# Patient Record
Sex: FEMALE | Race: OTHER | Employment: OTHER | URBAN - METROPOLITAN AREA
[De-identification: names, ages, dates, MRNs, and addresses within clinical notes are randomized per-mention and may not be internally consistent; named-entity substitution may affect disease eponyms.]

---

## 2018-04-20 NOTE — PATIENT DISCUSSION
Continue: Thera Tears Nutrition (om-3-epa-dha-fish oil-flax-e): capsule: [phone] dx-jv-yx-unit

## 2018-04-20 NOTE — PATIENT DISCUSSION
New Prescription: olopatadine (olopatadine): drops: 0.1% 1 drop twice a day as directed into both eyes 04-

## 2019-10-22 NOTE — PATIENT DISCUSSION
- Possibly contributing to her symptoms Will refill for 1 month and than needs to schedule appointment.

## 2021-06-02 ENCOUNTER — NEW PATIENT EMERGENCY (OUTPATIENT)
Dept: URBAN - METROPOLITAN AREA CLINIC 43 | Facility: CLINIC | Age: 71
End: 2021-06-02

## 2021-06-02 DIAGNOSIS — H04.412: ICD-10-CM

## 2021-06-02 PROCEDURE — 92002 INTRM OPH EXAM NEW PATIENT: CPT

## 2021-06-02 RX ORDER — CEPHALEXIN 500 MG/1: 1 CAPSULE ORAL TWICE A DAY

## 2021-06-02 ASSESSMENT — VISUAL ACUITY
OS_CC: 20/25
OD_CC: 20/20-1

## 2021-06-06 ENCOUNTER — EST. PATIENT EMERGENCY (OUTPATIENT)
Dept: URBAN - METROPOLITAN AREA CLINIC 39 | Facility: CLINIC | Age: 71
End: 2021-06-06

## 2021-06-06 DIAGNOSIS — H25.13: ICD-10-CM

## 2021-06-06 DIAGNOSIS — H04.412: ICD-10-CM

## 2021-06-06 DIAGNOSIS — H02.88A: ICD-10-CM

## 2021-06-06 DIAGNOSIS — H02.88B: ICD-10-CM

## 2021-06-06 PROCEDURE — 92012 INTRM OPH EXAM EST PATIENT: CPT

## 2021-06-06 ASSESSMENT — TONOMETRY
OS_IOP_MMHG: 20
OD_IOP_MMHG: 18

## 2021-06-06 ASSESSMENT — VISUAL ACUITY
OD_CC: 20/25-2
OS_CC: 20/30

## 2021-06-07 ENCOUNTER — ESTABLISHED PATIENT (OUTPATIENT)
Dept: URBAN - METROPOLITAN AREA CLINIC 44 | Facility: CLINIC | Age: 71
End: 2021-06-07

## 2021-06-07 DIAGNOSIS — H25.13: ICD-10-CM

## 2021-06-07 DIAGNOSIS — H04.412: ICD-10-CM

## 2021-06-07 DIAGNOSIS — H04.322: ICD-10-CM

## 2021-06-07 PROCEDURE — 68420 I&D LACRIMAL SAC: CPT

## 2021-06-07 PROCEDURE — 99213 OFFICE O/P EST LOW 20 MIN: CPT

## 2021-06-07 PROCEDURE — 92285 EXTERNAL OCULAR PHOTOGRAPHY: CPT

## 2021-06-07 RX ORDER — TOBRAMYCIN / DEXAMETHASONE 3; .5 MG/ML; MG/ML
1 SUSPENSION/ DROPS OPHTHALMIC
End: 2021-06-21

## 2021-06-07 ASSESSMENT — VISUAL ACUITY
OS_CC: 20/20
OD_CC: 20/20-1

## 2021-06-09 ENCOUNTER — FOLLOW UP (OUTPATIENT)
Dept: URBAN - METROPOLITAN AREA CLINIC 38 | Facility: CLINIC | Age: 71
End: 2021-06-09

## 2021-06-09 DIAGNOSIS — H04.412: ICD-10-CM

## 2021-06-09 DIAGNOSIS — H04.322: ICD-10-CM

## 2021-06-09 PROCEDURE — 99213 OFFICE O/P EST LOW 20 MIN: CPT

## 2021-06-09 PROCEDURE — 92285 EXTERNAL OCULAR PHOTOGRAPHY: CPT

## 2021-06-09 ASSESSMENT — VISUAL ACUITY
OD_CC: 20/25
OS_CC: 20/30-1

## 2021-09-28 ENCOUNTER — ESTABLISHED PATIENT (OUTPATIENT)
Dept: URBAN - METROPOLITAN AREA CLINIC 44 | Facility: CLINIC | Age: 71
End: 2021-09-28

## 2021-09-28 DIAGNOSIS — H04.552: ICD-10-CM

## 2021-09-28 DIAGNOSIS — H04.322: ICD-10-CM

## 2021-09-28 PROCEDURE — 99213 OFFICE O/P EST LOW 20 MIN: CPT

## 2021-09-28 PROCEDURE — 92285 EXTERNAL OCULAR PHOTOGRAPHY: CPT

## 2021-09-28 ASSESSMENT — VISUAL ACUITY
OS_CC: 20/20-1
OD_CC: 20/20

## 2021-10-05 ENCOUNTER — PRE-OP/H&P (OUTPATIENT)
Dept: URBAN - METROPOLITAN AREA CLINIC 44 | Facility: CLINIC | Age: 71
End: 2021-10-05

## 2021-10-05 DIAGNOSIS — H04.552: ICD-10-CM

## 2021-10-05 DIAGNOSIS — H04.412: ICD-10-CM

## 2021-10-05 DIAGNOSIS — H04.322: ICD-10-CM

## 2021-10-05 DIAGNOSIS — H25.13: ICD-10-CM

## 2021-10-05 PROCEDURE — 99211HP H&P OFFICE/OUTPATIENT VISIT, EST

## 2021-10-05 RX ORDER — ONDANSETRON HYDROCHLORIDE 8 MG/1
TABLET, FILM COATED ORAL ONCE A DAY
Start: 2021-11-02

## 2021-10-05 RX ORDER — TOBRAMYCIN AND DEXAMETHASONE 1; 3 MG/ML; MG/ML
1 SUSPENSION/ DROPS OPHTHALMIC
Start: 2021-11-02

## 2021-10-05 RX ORDER — CEPHALEXIN 500 MG/1
1 CAPSULE ORAL TWICE A DAY
Start: 2021-11-02

## 2021-11-02 ENCOUNTER — SURGERY/PROCEDURE (OUTPATIENT)
Dept: URBAN - METROPOLITAN AREA SURGERY 14 | Facility: SURGERY | Age: 71
End: 2021-11-02

## 2021-11-02 ENCOUNTER — PRE-OP/H&P (OUTPATIENT)
Dept: URBAN - METROPOLITAN AREA SURGERY 14 | Facility: SURGERY | Age: 71
End: 2021-11-02

## 2021-11-02 DIAGNOSIS — H04.412: ICD-10-CM

## 2021-11-02 DIAGNOSIS — H04.322: ICD-10-CM

## 2021-11-02 DIAGNOSIS — H04.552: ICD-10-CM

## 2021-11-02 PROCEDURE — 31200 REMOVAL OF ETHMOID SINUS: CPT

## 2021-11-02 PROCEDURE — 68720 CREATE TEAR SAC DRAIN: CPT

## 2021-11-02 PROCEDURE — 99211T TECH SERVICE

## 2021-11-02 PROCEDURE — 30999 UNLISTED PROCEDURE NOSE: CPT

## 2021-11-02 PROCEDURE — 68815 PROBE NASOLACRIMAL DUCT: CPT

## 2021-11-09 ENCOUNTER — POST-OP (OUTPATIENT)
Dept: URBAN - METROPOLITAN AREA CLINIC 44 | Facility: CLINIC | Age: 71
End: 2021-11-09

## 2021-11-09 DIAGNOSIS — Z98.890: ICD-10-CM

## 2021-11-09 DIAGNOSIS — H04.322: ICD-10-CM

## 2021-11-09 PROCEDURE — 92285 EXTERNAL OCULAR PHOTOGRAPHY: CPT

## 2021-11-09 PROCEDURE — 99024 POSTOP FOLLOW-UP VISIT: CPT

## 2021-11-09 ASSESSMENT — VISUAL ACUITY
OD_SC: 20/20
OS_SC: 20/20

## 2021-11-23 ENCOUNTER — FOLLOW UP (OUTPATIENT)
Dept: URBAN - METROPOLITAN AREA CLINIC 44 | Facility: CLINIC | Age: 71
End: 2021-11-23

## 2021-11-23 DIAGNOSIS — H04.322: ICD-10-CM

## 2021-11-23 DIAGNOSIS — H04.412: ICD-10-CM

## 2021-11-23 DIAGNOSIS — Z98.890: ICD-10-CM

## 2021-11-23 PROCEDURE — 92285 EXTERNAL OCULAR PHOTOGRAPHY: CPT

## 2021-11-23 PROCEDURE — 99024 POSTOP FOLLOW-UP VISIT: CPT

## 2021-11-23 ASSESSMENT — VISUAL ACUITY
OD_SC: 20/20
OS_SC: 20/20

## 2021-12-06 ENCOUNTER — FOLLOW UP (OUTPATIENT)
Dept: URBAN - METROPOLITAN AREA CLINIC 44 | Facility: CLINIC | Age: 71
End: 2021-12-06

## 2021-12-06 DIAGNOSIS — H04.412: ICD-10-CM

## 2021-12-06 DIAGNOSIS — Z98.890: ICD-10-CM

## 2021-12-06 DIAGNOSIS — H04.322: ICD-10-CM

## 2021-12-06 PROCEDURE — 92285 EXTERNAL OCULAR PHOTOGRAPHY: CPT

## 2021-12-06 PROCEDURE — 99213 OFFICE O/P EST LOW 20 MIN: CPT

## 2021-12-06 PROCEDURE — 68840 EXPLORE/IRRIGATE TEAR DUCTS: CPT

## 2021-12-06 ASSESSMENT — VISUAL ACUITY
OS_CC: 20/20
OD_CC: 20/20

## 2022-01-20 ENCOUNTER — PRE-OP/H&P (OUTPATIENT)
Dept: URBAN - METROPOLITAN AREA CLINIC 44 | Facility: CLINIC | Age: 72
End: 2022-01-20

## 2022-01-20 DIAGNOSIS — H04.322: ICD-10-CM

## 2022-01-20 DIAGNOSIS — H25.13: ICD-10-CM

## 2022-01-20 DIAGNOSIS — H04.412: ICD-10-CM

## 2022-01-20 DIAGNOSIS — Z98.890: ICD-10-CM

## 2022-01-20 PROCEDURE — 99211HP H&P OFFICE/OUTPATIENT VISIT, EST

## 2022-01-20 RX ORDER — CEPHALEXIN 500 MG/1
1 CAPSULE ORAL TWICE A DAY
Start: 2022-02-14

## 2022-01-20 RX ORDER — TOBRAMYCIN AND DEXAMETHASONE 1; 3 MG/ML; MG/ML
1 SUSPENSION/ DROPS OPHTHALMIC
Start: 2022-02-14

## 2022-02-16 ENCOUNTER — PRE-OP/H&P (OUTPATIENT)
Dept: URBAN - METROPOLITAN AREA SURGERY 14 | Facility: SURGERY | Age: 72
End: 2022-02-16

## 2022-02-16 ENCOUNTER — SURGERY/PROCEDURE (OUTPATIENT)
Dept: URBAN - METROPOLITAN AREA SURGERY 14 | Facility: SURGERY | Age: 72
End: 2022-02-16

## 2022-02-16 DIAGNOSIS — H04.322: ICD-10-CM

## 2022-02-16 DIAGNOSIS — H25.13: ICD-10-CM

## 2022-02-16 DIAGNOSIS — H04.412: ICD-10-CM

## 2022-02-16 DIAGNOSIS — Z98.890: ICD-10-CM

## 2022-02-16 PROCEDURE — 30999 UNLISTED PROCEDURE NOSE: CPT

## 2022-02-16 PROCEDURE — 68815 PROBE NASOLACRIMAL DUCT: CPT

## 2022-02-16 PROCEDURE — 68720 CREATE TEAR SAC DRAIN: CPT

## 2022-02-16 PROCEDURE — 99211T TECH SERVICE

## 2022-02-16 PROCEDURE — 31200 REMOVAL OF ETHMOID SINUS: CPT

## 2022-02-22 ENCOUNTER — POST-OP (OUTPATIENT)
Dept: URBAN - METROPOLITAN AREA CLINIC 44 | Facility: CLINIC | Age: 72
End: 2022-02-22

## 2022-02-22 DIAGNOSIS — Z98.890: ICD-10-CM

## 2022-02-22 PROCEDURE — 99024 POSTOP FOLLOW-UP VISIT: CPT

## 2022-02-22 PROCEDURE — 92285 EXTERNAL OCULAR PHOTOGRAPHY: CPT

## 2022-02-22 ASSESSMENT — VISUAL ACUITY
OD_CC: 20/20
OS_CC: 20/20

## 2022-03-29 ENCOUNTER — POST-OP (OUTPATIENT)
Dept: URBAN - METROPOLITAN AREA CLINIC 43 | Facility: CLINIC | Age: 72
End: 2022-03-29

## 2022-03-29 DIAGNOSIS — H25.13: ICD-10-CM

## 2022-03-29 DIAGNOSIS — H04.412: ICD-10-CM

## 2022-03-29 DIAGNOSIS — H04.322: ICD-10-CM

## 2022-03-29 DIAGNOSIS — Z98.890: ICD-10-CM

## 2022-03-29 PROCEDURE — 99024 POSTOP FOLLOW-UP VISIT: CPT

## 2022-03-29 PROCEDURE — 92285 EXTERNAL OCULAR PHOTOGRAPHY: CPT

## 2022-08-30 ENCOUNTER — ESTABLISHED PATIENT (OUTPATIENT)
Dept: URBAN - METROPOLITAN AREA CLINIC 44 | Facility: CLINIC | Age: 72
End: 2022-08-30

## 2022-08-30 DIAGNOSIS — H04.202: ICD-10-CM

## 2022-08-30 DIAGNOSIS — H25.13: ICD-10-CM

## 2022-08-30 DIAGNOSIS — D49.89: ICD-10-CM

## 2022-08-30 DIAGNOSIS — Z98.890: ICD-10-CM

## 2022-08-30 PROCEDURE — 92285 EXTERNAL OCULAR PHOTOGRAPHY: CPT

## 2022-08-30 PROCEDURE — 99212 OFFICE O/P EST SF 10 MIN: CPT

## 2022-08-30 ASSESSMENT — VISUAL ACUITY
OS_SC: 20/20
OD_SC: 20/20

## 2022-10-04 ENCOUNTER — FOLLOW UP (OUTPATIENT)
Dept: URBAN - METROPOLITAN AREA CLINIC 44 | Facility: CLINIC | Age: 72
End: 2022-10-04

## 2022-10-04 DIAGNOSIS — Z98.890: ICD-10-CM

## 2022-10-04 DIAGNOSIS — H04.412: ICD-10-CM

## 2022-10-04 DIAGNOSIS — D49.89: ICD-10-CM

## 2022-10-04 DIAGNOSIS — H04.202: ICD-10-CM

## 2022-10-04 DIAGNOSIS — H04.322: ICD-10-CM

## 2022-10-04 PROCEDURE — 99213 OFFICE O/P EST LOW 20 MIN: CPT

## 2022-10-04 PROCEDURE — 92285 EXTERNAL OCULAR PHOTOGRAPHY: CPT

## 2022-10-04 ASSESSMENT — VISUAL ACUITY
OS_SC: 20/20
OD_SC: 20/20

## 2022-10-11 ENCOUNTER — PRE-OP/H&P (OUTPATIENT)
Dept: URBAN - METROPOLITAN AREA CLINIC 44 | Facility: CLINIC | Age: 72
End: 2022-10-11

## 2022-10-11 DIAGNOSIS — H04.412: ICD-10-CM

## 2022-10-11 DIAGNOSIS — H04.322: ICD-10-CM

## 2022-10-11 PROCEDURE — 99211HP H&P OFFICE/OUTPATIENT VISIT, EST

## 2022-10-11 RX ORDER — ONDANSETRON HYDROCHLORIDE 8 MG/1: TABLET, FILM COATED ORAL

## 2022-10-25 ENCOUNTER — PRE-OP/H&P (OUTPATIENT)
Dept: URBAN - METROPOLITAN AREA SURGERY 14 | Facility: SURGERY | Age: 72
End: 2022-10-25

## 2022-10-25 ENCOUNTER — SURGERY/PROCEDURE (OUTPATIENT)
Dept: URBAN - METROPOLITAN AREA SURGERY 14 | Facility: SURGERY | Age: 72
End: 2022-10-25

## 2022-10-25 DIAGNOSIS — H04.412: ICD-10-CM

## 2022-10-25 DIAGNOSIS — H04.322: ICD-10-CM

## 2022-10-25 PROCEDURE — 99211T TECH SERVICE

## 2022-10-25 PROCEDURE — 68720 CREATE TEAR SAC DRAIN: CPT

## 2022-10-25 PROCEDURE — 68815 PROBE NASOLACRIMAL DUCT: CPT

## 2022-10-25 PROCEDURE — 30999 UNLISTED PROCEDURE NOSE: CPT

## 2022-10-25 PROCEDURE — 31200 REMOVAL OF ETHMOID SINUS: CPT

## 2022-11-01 ENCOUNTER — POST-OP (OUTPATIENT)
Dept: URBAN - METROPOLITAN AREA CLINIC 44 | Facility: CLINIC | Age: 72
End: 2022-11-01

## 2022-11-01 DIAGNOSIS — D49.89: ICD-10-CM

## 2022-11-01 DIAGNOSIS — H04.202: ICD-10-CM

## 2022-11-01 DIAGNOSIS — H04.322: ICD-10-CM

## 2022-11-01 DIAGNOSIS — H25.13: ICD-10-CM

## 2022-11-01 DIAGNOSIS — Z98.890: ICD-10-CM

## 2022-11-01 DIAGNOSIS — H04.412: ICD-10-CM

## 2022-11-01 PROCEDURE — 99024 POSTOP FOLLOW-UP VISIT: CPT

## 2022-11-22 ENCOUNTER — POST-OP (OUTPATIENT)
Dept: URBAN - METROPOLITAN AREA CLINIC 44 | Facility: CLINIC | Age: 72
End: 2022-11-22

## 2022-11-22 DIAGNOSIS — H04.202: ICD-10-CM

## 2022-11-22 DIAGNOSIS — H04.412: ICD-10-CM

## 2022-11-22 DIAGNOSIS — Z98.890: ICD-10-CM

## 2022-11-22 DIAGNOSIS — D49.89: ICD-10-CM

## 2022-11-22 DIAGNOSIS — H25.13: ICD-10-CM

## 2022-11-22 DIAGNOSIS — H04.322: ICD-10-CM

## 2022-11-22 PROCEDURE — 99024 POSTOP FOLLOW-UP VISIT: CPT

## 2022-11-22 PROCEDURE — 92285 EXTERNAL OCULAR PHOTOGRAPHY: CPT

## 2022-11-22 ASSESSMENT — VISUAL ACUITY
OS_SC: 20/20
OD_SC: 20/20

## 2022-11-29 ENCOUNTER — POST-OP (OUTPATIENT)
Dept: URBAN - METROPOLITAN AREA CLINIC 44 | Facility: CLINIC | Age: 72
End: 2022-11-29

## 2022-11-29 DIAGNOSIS — Z98.890: ICD-10-CM

## 2022-11-29 DIAGNOSIS — D49.89: ICD-10-CM

## 2022-11-29 PROCEDURE — 99024 POSTOP FOLLOW-UP VISIT: CPT

## 2022-11-29 ASSESSMENT — VISUAL ACUITY
OD_CC: 20/20
OS_CC: 20/20

## 2023-01-05 ENCOUNTER — POST-OP (OUTPATIENT)
Dept: URBAN - METROPOLITAN AREA CLINIC 44 | Facility: CLINIC | Age: 73
End: 2023-01-05

## 2023-01-05 DIAGNOSIS — Z98.890: ICD-10-CM

## 2023-01-05 PROCEDURE — 99212 OFFICE O/P EST SF 10 MIN: CPT

## 2023-01-05 PROCEDURE — 68801 DILATE TEAR DUCT OPENING: CPT

## 2023-01-05 ASSESSMENT — VISUAL ACUITY: OD_CC: 20/25

## 2023-10-31 ENCOUNTER — ESTABLISHED PATIENT (OUTPATIENT)
Dept: URBAN - METROPOLITAN AREA CLINIC 44 | Facility: CLINIC | Age: 73
End: 2023-10-31

## 2023-10-31 DIAGNOSIS — H04.202: ICD-10-CM

## 2023-10-31 DIAGNOSIS — H02.135: ICD-10-CM

## 2023-10-31 DIAGNOSIS — Z98.890: ICD-10-CM

## 2023-10-31 DIAGNOSIS — D49.89: ICD-10-CM

## 2023-10-31 DIAGNOSIS — T81.83XA: ICD-10-CM

## 2023-10-31 PROCEDURE — 92285 EXTERNAL OCULAR PHOTOGRAPHY: CPT

## 2023-10-31 PROCEDURE — 99213 OFFICE O/P EST LOW 20 MIN: CPT

## 2023-10-31 PROCEDURE — 68840 EXPLORE/IRRIGATE TEAR DUCTS: CPT | Mod: E2,LT

## 2023-10-31 ASSESSMENT — VISUAL ACUITY
OD_SC: 20/25
OS_SC: 20/25

## 2024-03-05 ENCOUNTER — PRE-OP/H&P (OUTPATIENT)
Dept: URBAN - METROPOLITAN AREA CLINIC 44 | Facility: CLINIC | Age: 74
End: 2024-03-05

## 2024-03-05 DIAGNOSIS — H02.135: ICD-10-CM

## 2024-03-05 PROCEDURE — 99211HP H&P OFFICE/OUTPATIENT VISIT, EST

## 2024-04-01 ENCOUNTER — SURGERY/PROCEDURE (OUTPATIENT)
Facility: LOCATION | Age: 74
End: 2024-04-01

## 2024-04-01 ENCOUNTER — PRE-OP/H&P (OUTPATIENT)
Facility: LOCATION | Age: 74
End: 2024-04-01

## 2024-04-01 DIAGNOSIS — H04.202: ICD-10-CM

## 2024-04-01 DIAGNOSIS — H02.135: ICD-10-CM

## 2024-04-01 PROCEDURE — 67950 REVISION OF EYELID: CPT

## 2024-04-01 PROCEDURE — 99211T TECH SERVICE

## 2024-04-01 PROCEDURE — 68320 REVISE/GRAFT EYELID LINING: CPT

## 2024-04-09 ENCOUNTER — POST-OP (OUTPATIENT)
Dept: URBAN - METROPOLITAN AREA CLINIC 44 | Facility: CLINIC | Age: 74
End: 2024-04-09

## 2024-04-09 DIAGNOSIS — Z98.890: ICD-10-CM

## 2024-04-09 PROCEDURE — 99024 POSTOP FOLLOW-UP VISIT: CPT

## 2024-04-09 PROCEDURE — 92285 EXTERNAL OCULAR PHOTOGRAPHY: CPT

## 2024-04-23 ENCOUNTER — POST-OP (OUTPATIENT)
Dept: URBAN - METROPOLITAN AREA CLINIC 44 | Facility: CLINIC | Age: 74
End: 2024-04-23

## 2024-04-23 DIAGNOSIS — Z98.890: ICD-10-CM

## 2024-04-23 PROCEDURE — 99024 POSTOP FOLLOW-UP VISIT: CPT

## 2024-04-23 PROCEDURE — 92285 EXTERNAL OCULAR PHOTOGRAPHY: CPT
